# Patient Record
Sex: MALE | NOT HISPANIC OR LATINO | Employment: UNEMPLOYED | URBAN - NONMETROPOLITAN AREA
[De-identification: names, ages, dates, MRNs, and addresses within clinical notes are randomized per-mention and may not be internally consistent; named-entity substitution may affect disease eponyms.]

---

## 2017-08-29 ENCOUNTER — OFFICE VISIT (OUTPATIENT)
Dept: URGENT CARE | Facility: PHYSICIAN GROUP | Age: 62
End: 2017-08-29
Payer: MEDICAID

## 2017-08-29 VITALS
HEART RATE: 67 BPM | HEIGHT: 69 IN | TEMPERATURE: 98.3 F | OXYGEN SATURATION: 94 % | WEIGHT: 315 LBS | BODY MASS INDEX: 46.65 KG/M2 | DIASTOLIC BLOOD PRESSURE: 80 MMHG | RESPIRATION RATE: 18 BRPM | SYSTOLIC BLOOD PRESSURE: 120 MMHG

## 2017-08-29 DIAGNOSIS — E66.01 MORBID OBESITY WITH BMI OF 50.0-59.9, ADULT (HCC): ICD-10-CM

## 2017-08-29 DIAGNOSIS — L03.115 CELLULITIS OF RIGHT LEG: Primary | ICD-10-CM

## 2017-08-29 PROCEDURE — 99214 OFFICE O/P EST MOD 30 MIN: CPT | Performed by: PHYSICIAN ASSISTANT

## 2017-08-29 RX ORDER — SULFAMETHOXAZOLE AND TRIMETHOPRIM 800; 160 MG/1; MG/1
1 TABLET ORAL 2 TIMES DAILY
Qty: 20 TAB | Refills: 0 | Status: SHIPPED | OUTPATIENT
Start: 2017-08-29 | End: 2018-06-06

## 2017-08-29 ASSESSMENT — PAIN SCALES - GENERAL: PAINLEVEL: 3=SLIGHT PAIN

## 2017-08-29 NOTE — PROGRESS NOTES
Chief Complaint   Patient presents with   • Leg Swelling     red hot to the touch x 3 days       HISTORY OF PRESENT ILLNESS: Patient is a 62 y.o. male who presents today becauseHe has right lower extremity redness, swelling, tenderness and warmth sensation. This has been going on for 3 days, getting worse everyday. Denies any fevers, chills, nausea, vomiting or diarrhea. He has not been taking any medications specific for symptoms.    Patient Active Problem List    Diagnosis Date Noted   • Morbid obesity with BMI of 50.0-59.9, adult ([facility]) 08/29/2017   • Primary localized osteoarthrosis, lower leg 07/23/2015   • Chronic pain of left knee 01/09/2014   • Skin tag 01/09/2014   • HTN (hypertension) 11/21/2012       Allergies:Other environmental    Current Outpatient Prescriptions Ordered in [facility]   Medication Sig Dispense Refill   • sulfamethoxazole-trimethoprim (BACTRIM DS) 800-160 MG tablet Take 1 Tab by mouth 2 times a day. 20 Tab 0   • guaifenesin-codeine (ROBITUSSIN AC) Solution oral solution Take 5 mL by mouth at bedtime as needed for Cough. 120 Bottle 0   • lisinopril (PRINIVIL) 20 MG Tab Take 40 mg by mouth every day.     • doxycycline (VIBRAMYCIN) 100 MG Tab Take 1 Tab by mouth 2 times a day. 20 Tab 0   • Omega-3 Fatty Acids (FISH OIL) 1000 MG CAPS capsule Take 1,000 mg by mouth 3 times a day, with meals.     • Misc Natural Products (WHITE WILLOW BARK PO) Take  by mouth.       No current Epic-ordered facility-administered medications on file.        Past Medical History:   Diagnosis Date   • HTN (hypertension) 11/21/2012    on Lisinopril   • Arthritis    • Breath shortness     with exertion, no exercise   • Pain     chronic low back pain   • Sleep apnea     cpap-bring with   • Transfusion history     rec'd blood transfusion after pelvic fx       Social History   Substance Use Topics   • Smoking status: Never Smoker   • Smokeless tobacco: Never Used   • Alcohol use No      Comment: occas       Family Status  "  Relation Status   • Mother    • Father      Family History   Problem Relation Age of Onset   • Cancer Mother      breast   • Diabetes Father    • Heart Disease Father        ROS:  Review of Systems   Constitutional: Negative for fever, chills, weight loss and malaise/fatigue.   HENT: Negative for ear pain, nosebleeds, congestion, sore throat and neck pain.    Eyes: Negative for blurred vision.   Respiratory: Negative for cough, sputum production, shortness of breath and wheezing.    Cardiovascular: Negative for chest pain, palpitations, orthopnea and positive for right lower leg swelling.   Gastrointestinal: Negative for heartburn, nausea, vomiting and abdominal pain.   Genitourinary: Negative for dysuria, urgency and frequency.     Exam:  Blood pressure 120/80, pulse 67, temperature 36.8 °C (98.3 °F), resp. rate 18, height 1.753 m (5' 9\"), weight (!) 155.9 kg (343 lb 12.8 oz), SpO2 94 %.  General:  Well nourished, well developed male in NAD  Head:Normocephalic, atraumatic  Eyes: PERRLA, EOM within normal limits, no conjunctival injection, no scleral icterus, visual fields and acuity grossly intact.  Mouth: reasonable hygiene, no erythema exudates or tonsillar enlargement.  Neck: no masses, range of motion within normal limits, no tracheal deviation. No obvious thyroid enlargement.  Pulmonary: chest is symmetrical with respiration, no wheezes, crackles, or rhonchi.  Cardiovascular: regular rate and rhythm without murmurs, rubs, or gallops.  Extremities: Right lower extremity has 1-2+ edema from the level of just below the patella. It is erythematous, blanching, tender and very warm to touch.    Please note that this dictation was created using voice recognition software. I have made every reasonable attempt to correct obvious errors, but I expect that there are errors of grammar and possibly content that I did not discover before finalizing the note.    Assessment/Plan:  1. Cellulitis of right leg  " sulfamethoxazole-trimethoprim (BACTRIM DS) 800-160 MG tablet   2. Morbid obesity with BMI of 50.0-59.9, adult ([facility])  Patient identified as having weight management issue.  Appropriate orders and counseling given.   , Elevate the foot    Followup with primary care in the next 7-10 days if not significantly improving, return to the urgent care or go to the emergency room sooner for any worsening of symptoms.

## 2018-06-06 ENCOUNTER — APPOINTMENT (OUTPATIENT)
Dept: RADIOLOGY | Facility: IMAGING CENTER | Age: 63
End: 2018-06-06
Attending: FAMILY MEDICINE
Payer: MEDICAID

## 2018-06-06 ENCOUNTER — OFFICE VISIT (OUTPATIENT)
Dept: URGENT CARE | Facility: PHYSICIAN GROUP | Age: 63
End: 2018-06-06
Payer: MEDICAID

## 2018-06-06 VITALS
HEIGHT: 70 IN | RESPIRATION RATE: 16 BRPM | DIASTOLIC BLOOD PRESSURE: 86 MMHG | SYSTOLIC BLOOD PRESSURE: 140 MMHG | HEART RATE: 91 BPM | WEIGHT: 315 LBS | OXYGEN SATURATION: 92 % | BODY MASS INDEX: 45.1 KG/M2 | TEMPERATURE: 98 F

## 2018-06-06 DIAGNOSIS — R10.9 ABDOMINAL PAIN, UNSPECIFIED ABDOMINAL LOCATION: ICD-10-CM

## 2018-06-06 DIAGNOSIS — R11.0 NAUSEA: ICD-10-CM

## 2018-06-06 PROCEDURE — 99214 OFFICE O/P EST MOD 30 MIN: CPT | Performed by: FAMILY MEDICINE

## 2018-06-06 PROCEDURE — 74019 RADEX ABDOMEN 2 VIEWS: CPT | Performed by: FAMILY MEDICINE

## 2018-06-06 RX ORDER — ONDANSETRON 4 MG/1
TABLET, ORALLY DISINTEGRATING ORAL
Qty: 15 TAB | Refills: 0 | Status: SHIPPED | OUTPATIENT
Start: 2018-06-06

## 2018-06-06 RX ORDER — DICYCLOMINE HCL 20 MG
TABLET ORAL
Qty: 20 TAB | Refills: 0 | Status: SHIPPED | OUTPATIENT
Start: 2018-06-06

## 2018-06-06 ASSESSMENT — PAIN SCALES - GENERAL: PAINLEVEL: 8=MODERATE-SEVERE PAIN

## 2018-06-06 NOTE — PROGRESS NOTES
Chief Complaint:    Chief Complaint   Patient presents with   • Abdominal Pain       History of Present Illness:    This is a new problem. He has diffuse abdominal pain (he reports the pain is severe) and nausea that started today after eating. Feels bloated and needing to burp. No vomiting or diarrhea. Had similar problem few weeks ago that he reports resolved after not eating for a few days. Has all abdominal organs. No meds tried for this.      Review of Systems:    Constitutional: Negative for fever, chills, and diaphoresis.   Eyes: Negative for change in vision, photophobia, pain, redness, and discharge.  ENT: Negative for ear pain, ear discharge, hearing loss, tinnitus, nasal congestion, nosebleeds, and sore throat.    Respiratory: Negative for cough, hemoptysis, sputum production, shortness of breath, wheezing, and stridor.    Cardiovascular: Negative for chest pain, palpitations, orthopnea, claudication, leg swelling, and PND.   Gastrointestinal: See HPI.   Genitourinary: Negative for dysuria, urinary urgency, urinary frequency, hematuria, and flank pain.   Musculoskeletal: No new symptoms.  Skin: Negative for rash and itching.   Neurological: Negative for dizziness, tingling, tremors, sensory change, speech change, focal weakness, seizures, loss of consciousness, and headaches.   Endo: Negative for polydipsia.   Heme: Does not bruise/bleed easily.   Psychiatric/Behavioral: Negative for depression, suicidal ideas, hallucinations, memory loss and substance abuse. The patient is not nervous/anxious and does not have insomnia.        Past Medical History:    Past Medical History:   Diagnosis Date   • Arthritis    • Breath shortness     with exertion, no exercise   • HTN (hypertension) 11/21/2012    on Lisinopril   • Pain     chronic low back pain   • Sleep apnea     cpap-bring with   • Transfusion history     rec'd blood transfusion after pelvic fx     Past Surgical History:    Past Surgical History:  "  Procedure Laterality Date   • KNEE UNICOMPARTMENTAL Left 7/23/2015    Procedure: total knee replacement;  Surgeon: Govind Arita M.D.;  Location: SURGERY [facility];  Service:    • HERNIA REPAIR      umbical   • HIP ARTHROPLASTY TOTAL Right     right   • KNEE ARTHROSCOPY Left    • PELVIS ORIF       Social History:    Social History     Social History   • Marital status: Single     Spouse name: N/A   • Number of children: N/A   • Years of education: N/A     Occupational History   • Not on file.     Social History Main Topics   • Smoking status: Never Smoker   • Smokeless tobacco: Never Used   • Alcohol use No      Comment: occas   • Drug use: No   • Sexual activity: Yes     Partners: Female     Other Topics Concern   • Not on file     Social History Narrative   • No narrative on file     Family History:    Family History   Problem Relation Age of Onset   • Cancer Mother      breast   • Diabetes Father    • Heart Disease Father      Medications:    Current Outpatient Prescriptions on File Prior to Visit   Medication Sig Dispense Refill   • lisinopril (PRINIVIL) 20 MG Tab Take 40 mg by mouth every day.     • Omega-3 Fatty Acids (FISH OIL) 1000 MG CAPS capsule Take 1,000 mg by mouth 3 times a day, with meals.     • Misc Natural Products (WHITE WILLOW BARK PO) Take  by mouth.       No current facility-administered medications on file prior to visit.      Allergies:    Allergies   Allergen Reactions   • Other Environmental      Bees         Vitals:    Vitals:    06/06/18 1241   BP: 140/86   Pulse: 91   Resp: 16   Temp: 36.7 °C (98 °F)   SpO2: 92%   Weight: (!) 153.4 kg (338 lb 3.2 oz)   Height: 1.778 m (5' 10\")       Physical Exam:    Constitutional: Vital signs reviewed. Appears well-developed and well-nourished. No acute distress.   Eyes: Sclera white, conjunctivae clear.  ENT: External ears normal. Hearing normal.   Neck: Neck supple.   Pulmonary/Chest: Respirations non-labored.   Abdomen: He reports he is " more tender on the left side to palpation but does not show any physical signs he is tender when I palpate throughout the abdomen. Bowel sounds are normal active. Soft. ? distension due to body habitus.  Musculoskeletal: Normal gait. No muscular atrophy or weakness.  Neurological: Alert and oriented to person, place, and time. Muscle tone normal. Coordination normal.   Skin: No rashes or lesions. Warm, dry, normal turgor.  Psychiatric: Normal mood and affect. Behavior is normal. Judgment and thought content normal.     Diagnostics:    PR-[id number] VIEWS (Order #[id number]) on 6/6/18   Narrative       6/6/2018 1:11 PM    HISTORY/REASON FOR EXAM:  Abdominal Pain.      TECHNIQUE/EXAM DESCRIPTION AND NUMBER OF VIEWS:  2 view(s) of the abdomen.    COMPARISON: None    FINDINGS:  The abdominal bowel gas pattern is normal.    No abnormal calcifications are identified.    There is facet arthropathy of the lumbar spine. There is a right hip arthroplasty.    There is left hip osteoarthritis.         Impression       No dilated bowel     I personally reviewed the images. Images and Rad report reviewed with him and copy of report to him.      Assessment / Plan:    1. Abdominal pain, unspecified abdominal location  - [id number] VIEWS; Future  - dicyclomine (BENTYL) 20 MG Tab; 1 TAB BY MOUTH EVERY 6 HOURS ONLY IF NEEDED FOR ABDOMINAL PAIN.  Dispense: 20 Tab; Refill: 0    2. Nausea  - ondansetron (ZOFRAN ODT) 4 MG TABLET DISPERSIBLE; 1 TAB, ALLOW TO DISINTEGRATE IN MOUTH, EVERY 8 HOURS ONLY IF NEEDED FOR NAUSEA OR VOMITING.  Dispense: 15 Tab; Refill: 0      Discussed with him DDX and management options.    His abdominal exam is benign, he does not appear to have an acute abdomen on exam. Advised of potential for more serious condition that needs CT imaging to evaluate (we do not have CT here).    Agreeable to medications prescribed.    Advised if getting worse or medications do not help today, he should go to Emergency Room for  further evaluation.    May return to urgent care if needed.

## 2018-12-22 ENCOUNTER — OFFICE VISIT (OUTPATIENT)
Dept: URGENT CARE | Facility: PHYSICIAN GROUP | Age: 63
End: 2018-12-22
Payer: MEDICAID

## 2018-12-22 ENCOUNTER — APPOINTMENT (OUTPATIENT)
Dept: RADIOLOGY | Facility: IMAGING CENTER | Age: 63
End: 2018-12-22
Attending: PHYSICIAN ASSISTANT
Payer: MEDICAID

## 2018-12-22 VITALS
OXYGEN SATURATION: 91 % | BODY MASS INDEX: 45.1 KG/M2 | RESPIRATION RATE: 16 BRPM | HEART RATE: 92 BPM | TEMPERATURE: 97.1 F | WEIGHT: 315 LBS | SYSTOLIC BLOOD PRESSURE: 140 MMHG | HEIGHT: 70 IN | DIASTOLIC BLOOD PRESSURE: 86 MMHG

## 2018-12-22 DIAGNOSIS — R79.81 LOW OXYGEN SATURATION: ICD-10-CM

## 2018-12-22 DIAGNOSIS — J06.9 URI WITH COUGH AND CONGESTION: ICD-10-CM

## 2018-12-22 PROCEDURE — 99214 OFFICE O/P EST MOD 30 MIN: CPT | Performed by: PHYSICIAN ASSISTANT

## 2018-12-22 PROCEDURE — 71046 X-RAY EXAM CHEST 2 VIEWS: CPT | Performed by: FAMILY MEDICINE

## 2018-12-22 NOTE — PROGRESS NOTES
Chief Complaint   Patient presents with   • URI     with green yellow mucus/ x6d       HISTORY OF PRESENT ILLNESS: Patient is a 63 y.o. male who presents today for the following:    Cough x 5 days  Yellow sputum, yellow nasal drainage, sweats, low energy  Denies ear pain, ST  OTC meds tried: cough syrup, helping a little bit  Denies history of smoking, asthma, COPD  Does use a CPAP at night.     Patient Active Problem List    Diagnosis Date Noted   • Morbid obesity with BMI of 50.0-59.9, adult ([facility]) 08/29/2017   • Primary localized osteoarthrosis, lower leg 07/23/2015   • Chronic pain of left knee 01/09/2014   • Skin tag 01/09/2014   • HTN (hypertension) 11/21/2012       Allergies:Other environmental    Current Outpatient Prescriptions Ordered in [facility]   Medication Sig Dispense Refill   • lisinopril (PRINIVIL) 20 MG Tab Take 40 mg by mouth every day.     • ondansetron (ZOFRAN ODT) 4 MG TABLET DISPERSIBLE 1 TAB, ALLOW TO DISINTEGRATE IN MOUTH, EVERY 8 HOURS ONLY IF NEEDED FOR NAUSEA OR VOMITING. (Patient not taking: Reported on 12/22/2018) 15 Tab 0   • dicyclomine (BENTYL) 20 MG Tab 1 TAB BY MOUTH EVERY 6 HOURS ONLY IF NEEDED FOR ABDOMINAL PAIN. (Patient not taking: Reported on 12/22/2018) 20 Tab 0   • Omega-3 Fatty Acids (FISH OIL) 1000 MG CAPS capsule Take 1,000 mg by mouth 3 times a day, with meals.     • Misc Natural Products (WHITE WILLOW BARK PO) Take  by mouth.       No current Epic-ordered facility-administered medications on file.        Past Medical History:   Diagnosis Date   • Arthritis    • Breath shortness     with exertion, no exercise   • HTN (hypertension) 11/21/2012    on Lisinopril   • Pain     chronic low back pain   • Sleep apnea     cpap-bring with   • Transfusion history     rec'd blood transfusion after pelvic fx       Social History   Substance Use Topics   • Smoking status: Never Smoker   • Smokeless tobacco: Never Used   • Alcohol use No      Comment: occas       Family Status   Relation  "Status   • Mo    • Fa      Family History   Problem Relation Age of Onset   • Cancer Mother         breast   • Diabetes Father    • Heart Disease Father        Review of Systems:   Constitutional ROS: No unexpected change in weight, No weakness, No fatigue  Eye ROS: No recent significant change in vision, No eye pain, redness, discharge  Ear ROS: No drainage, No tinnitus or vertigo, No recent change in hearing  Mouth/Throat ROS: No teeth or gum problems, No bleeding gums, No tongue complaints  Neck ROS: No swollen glands, No significant pain in neck  Pulmonary ROS: No chronic cough, sputum, or hemoptysis, No dyspnea on exertion, No wheezing  Cardiovascular ROS: No diaphoresis, No edema, No palpitations  Gastrointestinal ROS: No change in bowel habits, No significant change in appetite, No nausea, vomiting, diarrhea, or constipation  Musculoskeletal/Extremities ROS: No peripheral edema, No pain, redness or swelling on the joints  Skin/Integumentary ROS: No edema, No evidence of rash, No itching      Exam:  Blood pressure 140/86, pulse 92, temperature 36.2 °C (97.1 °F), temperature source Temporal, resp. rate 16, height 1.778 m (5' 10\"), weight (!) 151.5 kg (334 lb), SpO2 91 %.  General: Well developed, well nourished. No distress.  Eye: PERRL/EOMI; conjunctivae clear, lids normal.  ENMT: Lips without lesions, MMM. Oropharynx is clear. Bilateral TMs are within normal limits.  Pulmonary: Unlabored respiratory effort. Lungs clear to auscultation, no wheezes, no rhonchi.  Cardiovascular: Regular rate and rhythm without murmur.    Neurologic: Grossly nonfocal. No facial asymmetry noted.  Lymph: No cervical lymphadenopathy noted.  Skin: Warm, dry, good turgor. No rashes in visible areas.   Psych: Normal mood. Alert and oriented x3. Judgment and insight is normal.    Chest x-ray, per radiology:  Impression         1. No active cardiopulmonary abnormalities are identified.     Assessment/Plan:  Discussed " likely viral etiology.  Patient declines cough suppressant medication.  Drink plenty fluids.  Return to clinic for worsening or persistent symptoms  1. URI with cough and congestion     2. Low oxygen saturation  DX-CHEST-2 VIEWS

## 2019-03-07 ENCOUNTER — OFFICE VISIT (OUTPATIENT)
Dept: URGENT CARE | Facility: PHYSICIAN GROUP | Age: 64
End: 2019-03-07
Payer: MEDICAID

## 2019-03-07 VITALS
TEMPERATURE: 98.2 F | OXYGEN SATURATION: 96 % | DIASTOLIC BLOOD PRESSURE: 102 MMHG | HEART RATE: 97 BPM | SYSTOLIC BLOOD PRESSURE: 150 MMHG | RESPIRATION RATE: 22 BRPM

## 2019-03-07 DIAGNOSIS — L03.90 CELLULITIS, UNSPECIFIED CELLULITIS SITE: ICD-10-CM

## 2019-03-07 PROCEDURE — 99214 OFFICE O/P EST MOD 30 MIN: CPT | Performed by: PHYSICIAN ASSISTANT

## 2019-03-07 RX ORDER — SULFAMETHOXAZOLE AND TRIMETHOPRIM 800; 160 MG/1; MG/1
1 TABLET ORAL 2 TIMES DAILY
Qty: 20 TAB | Refills: 0 | Status: SHIPPED | OUTPATIENT
Start: 2019-03-07 | End: 2023-07-31

## 2019-03-07 ASSESSMENT — PAIN SCALES - GENERAL: PAINLEVEL: 6=MODERATE PAIN

## 2019-03-08 NOTE — PROGRESS NOTES
Chief Complaint   Patient presents with   • Rash     right leg x 2 days        HISTORY OF PRESENT ILLNESS: Patient is a 63 y.o. male who presents today for the following:    Patient comes in for evaluation of redness, swelling, and pain in his right anterior lower leg.  He noticed it this morning.  He does report subjective fever, chills, body aches yesterday but has not had anything today.  He has not taken any over-the-counter antipyretic medication.   He has not noticed any drainage from the leg.    Patient Active Problem List    Diagnosis Date Noted   • Morbid obesity with BMI of 50.0-59.9, adult ([facility]) 08/29/2017   • Primary localized osteoarthrosis, lower leg 07/23/2015   • Chronic pain of left knee 01/09/2014   • Skin tag 01/09/2014   • HTN (hypertension) 11/21/2012       Allergies:Other environmental    Current Outpatient Prescriptions Ordered in [facility]   Medication Sig Dispense Refill   • sulfamethoxazole-trimethoprim (BACTRIM DS) 800-160 MG tablet Take 1 Tab by mouth 2 times a day. 20 Tab 0   • lisinopril (PRINIVIL) 20 MG Tab Take 40 mg by mouth every day.     • Omega-3 Fatty Acids (FISH OIL) 1000 MG CAPS capsule Take 1,000 mg by mouth 3 times a day, with meals.     • Misc Natural Products (WHITE WILLOW BARK PO) Take  by mouth.     • ondansetron (ZOFRAN ODT) 4 MG TABLET DISPERSIBLE 1 TAB, ALLOW TO DISINTEGRATE IN MOUTH, EVERY 8 HOURS ONLY IF NEEDED FOR NAUSEA OR VOMITING. (Patient not taking: Reported on 12/22/2018) 15 Tab 0   • dicyclomine (BENTYL) 20 MG Tab 1 TAB BY MOUTH EVERY 6 HOURS ONLY IF NEEDED FOR ABDOMINAL PAIN. (Patient not taking: Reported on 12/22/2018) 20 Tab 0     No current Epic-ordered facility-administered medications on file.        Past Medical History:   Diagnosis Date   • Arthritis    • Breath shortness     with exertion, no exercise   • HTN (hypertension) 11/21/2012    on Lisinopril   • Pain     chronic low back pain   • Sleep apnea     cpap-bring with   • Transfusion history     rec'd  blood transfusion after pelvic fx       Social History   Substance Use Topics   • Smoking status: Never Smoker   • Smokeless tobacco: Never Used   • Alcohol use No      Comment: occas       Family Status   Relation Status   • Mo    • Fa      Family History   Problem Relation Age of Onset   • Cancer Mother         breast   • Diabetes Father    • Heart Disease Father        Review of Systems:    Constitutional ROS: No unexpected change in weight, No weakness, No fatigue  Eye ROS: No recent significant change in vision, No eye pain, redness, discharge  Ear ROS: No drainage, No tinnitus or vertigo, No recent change in hearing  Mouth/Throat ROS: No teeth or gum problems, No bleeding gums, No tongue complaints  Neck ROS: No swollen glands, No significant pain in neck  Pulmonary ROS: No chronic cough, sputum, or hemoptysis, No dyspnea on exertion, No wheezing  Cardiovascular ROS: No diaphoresis, No edema, No palpitations  Gastrointestinal ROS: No change in bowel habits, No significant change in appetite, No nausea, vomiting, diarrhea, or constipation  Musculoskeletal/Extremities ROS: No peripheral edema, No pain, redness or swelling on the joints  Hematologic/Lymphatic ROS: No chills, No night sweats, No weight loss  Skin/Integumentary ROS: Redness, swelling, and pain of the right lower leg.      Exam:  Blood pressure 150/102, pulse 97, temperature 36.8 °C (98.2 °F), temperature source Temporal, resp. rate (!) 22, SpO2 96 %.  General: Well developed, well nourished. No distress.  HEENT: Head is grossly normal.  Pulmonary: Unlabored respiratory effort.    Extremities: Most of the anterior right lower leg is erythematous with well demarcated borders, tender to touch, and slightly edematous.  No drainage is noted.  Psych: Normal mood. Alert and oriented x3. Judgment and insight is normal.    Assessment/Plan:  Use all medication as directed.  Follow up for worsening or persistent symptoms.  1. Cellulitis,  unspecified cellulitis site  sulfamethoxazole-trimethoprim (BACTRIM DS) 800-160 MG tablet    ?  Erysipelas.  Patient reports that GI reaction with penicillin.  TMP/SMZ listed as alternative.

## 2023-07-31 ENCOUNTER — OFFICE VISIT (OUTPATIENT)
Dept: URGENT CARE | Facility: PHYSICIAN GROUP | Age: 68
End: 2023-07-31
Payer: MEDICARE

## 2023-07-31 VITALS
SYSTOLIC BLOOD PRESSURE: 128 MMHG | BODY MASS INDEX: 45.18 KG/M2 | OXYGEN SATURATION: 95 % | WEIGHT: 305 LBS | TEMPERATURE: 98.3 F | DIASTOLIC BLOOD PRESSURE: 84 MMHG | HEIGHT: 69 IN | HEART RATE: 100 BPM | RESPIRATION RATE: 16 BRPM

## 2023-07-31 DIAGNOSIS — I89.1 LYMPHANGITIS: ICD-10-CM

## 2023-07-31 DIAGNOSIS — S91.109A OPEN WOUND OF TOE, INITIAL ENCOUNTER: ICD-10-CM

## 2023-07-31 DIAGNOSIS — L03.115 CELLULITIS OF RIGHT LOWER EXTREMITY: ICD-10-CM

## 2023-07-31 PROCEDURE — 3079F DIAST BP 80-89 MM HG: CPT | Performed by: NURSE PRACTITIONER

## 2023-07-31 PROCEDURE — 99213 OFFICE O/P EST LOW 20 MIN: CPT | Performed by: NURSE PRACTITIONER

## 2023-07-31 PROCEDURE — 3074F SYST BP LT 130 MM HG: CPT | Performed by: NURSE PRACTITIONER

## 2023-07-31 RX ORDER — CEPHALEXIN 500 MG/1
500 CAPSULE ORAL 4 TIMES DAILY
Qty: 20 CAPSULE | Refills: 0 | Status: SHIPPED | OUTPATIENT
Start: 2023-07-31 | End: 2023-08-05

## 2023-07-31 RX ORDER — SULFAMETHOXAZOLE AND TRIMETHOPRIM 800; 160 MG/1; MG/1
1 TABLET ORAL 2 TIMES DAILY
Qty: 14 TABLET | Refills: 0 | Status: SHIPPED | OUTPATIENT
Start: 2023-07-31 | End: 2023-08-07

## 2023-07-31 ASSESSMENT — ENCOUNTER SYMPTOMS: LEG PAIN: 1

## 2023-08-01 NOTE — PROGRESS NOTES
Subjective:     Nando Toney is a 67 y.o. male who presents for Leg Pain (X 1 day on L lower leg. He states he clipped toenail too short a few days ago.  He is having redness going up to his R knee. Hx of Cellulitis. )      Leg Pain      Pt presents for evaluation of a new problem. Nando is a pleasant 67-year-old male who presents to urgent care today with complaints of pain, swelling and erythema of his right lower extremity.  He does have a red line that is tracking up his thigh.  He first noticed his symptoms today.  He does endorse chills but no known fever.  His stomach has been upset today.  Negative for nausea, vomiting or diarrhea.  He has not use any medication for his symptoms.  Additionally, he does note 1 week ago he was trimming his toenail and accidentally cut his skin.  He is a type II diabetic.  No treatment has been used for his symptoms.    ROS    PMH:   Past Medical History:   Diagnosis Date    Arthritis     Breath shortness     with exertion, no exercise    HTN (hypertension) 11/21/2012    on Lisinopril    Pain     chronic low back pain    Sleep apnea     cpap-bring with    Transfusion history     rec'd blood transfusion after pelvic fx     ALLERGIES:   Allergies   Allergen Reactions    Other Environmental      Bees       SURGHX:   Past Surgical History:   Procedure Laterality Date    KNEE UNICOMPARTMENTAL Left 7/23/2015    Procedure: total knee replacement;  Surgeon: Govind Arita M.D.;  Location: SURGERY [facility];  Service:     HERNIA REPAIR      umbical    HIP ARTHROPLASTY TOTAL Right     right    KNEE ARTHROSCOPY Left     ORIF, PELVIS       SOCHX:   Social History     Socioeconomic History    Marital status: Single   Tobacco Use    Smoking status: Never    Smokeless tobacco: Never   Vaping Use    Vaping Use: Never used   Substance and Sexual Activity    Alcohol use: No     Comment: occas    Drug use: No    Sexual activity: Yes     Partners: Female     FH:   Family History  "  Problem Relation Age of Onset    Cancer Mother         breast    Diabetes Father     Heart Disease Father          Objective:   /84   Pulse 100   Temp 36.8 °C (98.3 °F) (Temporal)   Resp 16   Ht 1.753 m (5' 9\")   Wt (!) 138 kg (305 lb)   SpO2 95%   BMI 45.04 kg/m²     Physical Exam  Vitals and nursing note reviewed.   Constitutional:       General: He is not in acute distress.     Appearance: Normal appearance. He is obese. He is not ill-appearing or toxic-appearing.   HENT:      Head: Normocephalic.      Right Ear: External ear normal.      Left Ear: External ear normal.      Nose: Nose normal.      Mouth/Throat:      Mouth: Mucous membranes are moist.   Eyes:      General:         Right eye: No discharge.         Left eye: No discharge.      Extraocular Movements: Extraocular movements intact.      Conjunctiva/sclera: Conjunctivae normal.      Pupils: Pupils are equal, round, and reactive to light.   Cardiovascular:      Rate and Rhythm: Tachycardia present.      Pulses: Normal pulses.      Heart sounds: Normal heart sounds.   Pulmonary:      Effort: Pulmonary effort is normal.      Breath sounds: Normal breath sounds.   Abdominal:      General: Abdomen is flat.   Musculoskeletal:         General: Normal range of motion.      Cervical back: Normal range of motion and neck supple. No rigidity.   Lymphadenopathy:      Cervical: No cervical adenopathy.   Skin:     General: Skin is warm and dry.      Comments: Erythema and swelling present of right lower extremity.  Positive for lymphangitis tracking up to right inner thigh.  No active lesions, abrasions or lacerations.  Negative for drainage.  Right fifth digit with open laceration and scant bloody drainage   Neurological:      General: No focal deficit present.      Mental Status: He is alert and oriented to person, place, and time. Mental status is at baseline.   Psychiatric:         Mood and Affect: Mood normal.         Behavior: Behavior normal.    "      Judgment: Judgment normal.         Assessment/Plan:   Assessment    1. Cellulitis of right lower extremity  sulfamethoxazole-trimethoprim (BACTRIM DS) 800-160 MG tablet    cephALEXin (KEFLEX) 500 MG Cap      2. Lymphangitis  sulfamethoxazole-trimethoprim (BACTRIM DS) 800-160 MG tablet    cephALEXin (KEFLEX) 500 MG Cap      3. Open wound of toe, initial encounter  sulfamethoxazole-trimethoprim (BACTRIM DS) 800-160 MG tablet    cephALEXin (KEFLEX) 500 MG Cap          Patient placed on doxycycline and Keflex for treatment of infection.  Tylenol/ibuprofen as needed for relief of discomfort.  Strict ER precautions given for worsening or persistent symptoms including increasing erythema and swelling, fever greater than 100.9 or vomiting.  He verbalizes agreement understanding of plan of care.